# Patient Record
Sex: FEMALE | Race: AMERICAN INDIAN OR ALASKA NATIVE
[De-identification: names, ages, dates, MRNs, and addresses within clinical notes are randomized per-mention and may not be internally consistent; named-entity substitution may affect disease eponyms.]

---

## 2019-04-13 ENCOUNTER — HOSPITAL ENCOUNTER (EMERGENCY)
Dept: HOSPITAL 5 - ED | Age: 24
LOS: 1 days | Discharge: HOME | End: 2019-04-14
Payer: MEDICAID

## 2019-04-13 VITALS — DIASTOLIC BLOOD PRESSURE: 70 MMHG | SYSTOLIC BLOOD PRESSURE: 117 MMHG

## 2019-04-13 DIAGNOSIS — F17.200: ICD-10-CM

## 2019-04-13 DIAGNOSIS — K52.9: Primary | ICD-10-CM

## 2019-04-13 DIAGNOSIS — F12.10: ICD-10-CM

## 2019-04-13 LAB
ALBUMIN SERPL-MCNC: 3.5 G/DL (ref 3.9–5)
ALT SERPL-CCNC: 10 UNITS/L (ref 7–56)
BASOPHILS # (AUTO): 0 K/MM3 (ref 0–0.1)
BASOPHILS NFR BLD AUTO: 0.4 % (ref 0–1.8)
BILIRUB UR QL STRIP: (no result)
BLOOD UR QL VISUAL: (no result)
BUN SERPL-MCNC: 7 MG/DL (ref 7–17)
BUN/CREAT SERPL: 12 %
CALCIUM SERPL-MCNC: 8.5 MG/DL (ref 8.4–10.2)
EOSINOPHIL # BLD AUTO: 0.1 K/MM3 (ref 0–0.4)
EOSINOPHIL NFR BLD AUTO: 1.1 % (ref 0–4.3)
HCT VFR BLD CALC: 35.7 % (ref 30.3–42.9)
HEMOLYSIS INDEX: 9
HGB BLD-MCNC: 11.2 GM/DL (ref 10.1–14.3)
LYMPHOCYTES # BLD AUTO: 1.9 K/MM3 (ref 1.2–5.4)
LYMPHOCYTES NFR BLD AUTO: 22.3 % (ref 13.4–35)
MCHC RBC AUTO-ENTMCNC: 32 % (ref 30–34)
MCV RBC AUTO: 78 FL (ref 79–97)
MONOCYTES # (AUTO): 0.4 K/MM3 (ref 0–0.8)
MONOCYTES % (AUTO): 4.7 % (ref 0–7.3)
MUCOUS THREADS #/AREA URNS HPF: (no result) /HPF
PH UR STRIP: 6 [PH] (ref 5–7)
PLATELET # BLD: 259 K/MM3 (ref 140–440)
PROT UR STRIP-MCNC: (no result) MG/DL
RBC # BLD AUTO: 4.56 M/MM3 (ref 3.65–5.03)
RBC #/AREA URNS HPF: 1 /HPF (ref 0–6)
UROBILINOGEN UR-MCNC: 2 MG/DL (ref ?–2)
WBC #/AREA URNS HPF: 3 /HPF (ref 0–6)

## 2019-04-13 PROCEDURE — 81001 URINALYSIS AUTO W/SCOPE: CPT

## 2019-04-13 PROCEDURE — 85025 COMPLETE CBC W/AUTO DIFF WBC: CPT

## 2019-04-13 PROCEDURE — 81025 URINE PREGNANCY TEST: CPT

## 2019-04-13 PROCEDURE — 80053 COMPREHEN METABOLIC PANEL: CPT

## 2019-04-13 PROCEDURE — 83690 ASSAY OF LIPASE: CPT

## 2019-04-13 PROCEDURE — 74176 CT ABD & PELVIS W/O CONTRAST: CPT

## 2019-04-13 PROCEDURE — 36415 COLL VENOUS BLD VENIPUNCTURE: CPT

## 2019-04-13 NOTE — EMERGENCY DEPARTMENT REPORT
Vomiting/Diarrhea





- HPI


Chief Complaint: Abdominal Pain


Stated Complaint: ABD PAIN VOMITTING


Time Seen by Provider: 19 22:09


Duration: 3 Days


Severity: moderate


Nausea/Vomiting Severity: Moderate


Diarrhea Severity: None


Pain Location: Generalized


Pain Severity: Moderate


Symptoms: Yes Able to Tolerate Fluids, No Watery Diarrhea, No Bloody diarrhea, 

No Fever, No Recent Unusual Foods, No Recent Untreated Water, No Recent use of 

Antibiotics, No Family w/ Similar Symptoms, No Contacts w/ Similar Symptoms, No 

Rash, No Hematuria, No Recent URI Symptoms


Other History: This is a 23-year-old -American female who presents with 

nausea and vomiting, abdominal pain for 3 days.  Patient's pain initially 

started after her last cycle on 2019.  States symptoms for about 3-4 days 

but returned 3 days ago.  Patient is  A0.  States she does not believe she 

is pregnant but also concerned.  She denies diarrhea, fever, dizziness, urinary 

frequency, urgency, dysuria, vaginal bleeding or discharge.





ED Review of Systems


ROS: 


Stated complaint: ABD PAIN VOMITTING


Other details as noted in HPI





Constitutional: denies: chills, fever


Respiratory: denies: cough, shortness of breath, wheezing


Cardiovascular: denies: chest pain, palpitations


Gastrointestinal: abdominal pain, nausea, vomiting.  denies: diarrhea


Genitourinary: denies: urgency, dysuria, discharge


Musculoskeletal: denies: back pain


Skin: denies: rash, lesions


Neurological: denies: headache, weakness, paresthesias


Psychiatric: denies: anxiety, depression





ED Past Medical Hx





- Past Medical History


Previous Medical History?: No





- Surgical History


Past Surgical History?: Yes


Additional Surgical History:  x1





- Social History


Smoking Status: Current Every Day Smoker


Substance Use Type: Marijuana





- Medications


Home Medications: 


                                Home Medications











 Medication  Instructions  Recorded  Confirmed  Last Taken  Type


 


Naproxen [Naprosyn] 500 mg PO BID #14 tablet 18  Unknown Rx


 


Ibuprofen [Motrin 800 MG tab] 800 mg PO Q8HR PRN #12 tablet 19  Unknown Rx


 


Ondansetron [Zofran Odt] 4 mg PO Q8HR PRN #12 tab.rapdis 19  Unknown Rx














Vomiting Diarrhea Exam





- Exam


General: 


Vital signs noted. No distress. Alert and acting appropriately.





HEENT: Yes Moist Mucous Membranes, No Pharyngeal Erythema, No Pharyngeal 

Exudates, No Rhinorrhea, No Conjuctival Injection, No Frontal Tenderness, No 

Maxillary Tenderness


Neck: No Adenopathy, No Rigidity


Lungs: Yes Clear Lung Sounds, Yes Good Air Exchange, No Wheezes, No Stridor, No 

Cough, No Nasal Flaring, No Retractions, No Use of Accessory Muscles


Heart exam: Regular: Yes, Murmur: No, Tachycardia: No


Abdomen: Tenderness: Yes (left lower quadrant or right lower quadrant), 

Peritoneal Signs: No, Distention: No, Hyperactive Bowel sounds: No


Skin exam: Rash: No, Edema: No, Normal turgor: Yes


Neurologic: 


Alert and oriented, no deficits.








Musculoskeletal: 


Unremarkable.











ED Course


                                   Vital Signs











  19





  20:16


 


Temperature 98.8 F


 


Pulse Rate 101 H


 


Respiratory 16





Rate 


 


Blood Pressure 117/70














ED Medical Decision Making





- Lab Data


Result diagrams: 


                                 19 22:49





                                 19 22:49





                                   Lab Results











  19 Range/Units





  20:32 22:49 22:49 


 


WBC   8.5   (4.5-11.0)  K/mm3


 


RBC   4.56   (3.65-5.03)  M/mm3


 


Hgb   11.2   (10.1-14.3)  gm/dl


 


Hct   35.7   (30.3-42.9)  %


 


MCV   78 L   (79-97)  fl


 


MCH   25 L   (28-32)  pg


 


MCHC   32   (30-34)  %


 


RDW   16.5 H   (13.2-15.2)  %


 


Plt Count   259   (140-440)  K/mm3


 


Lymph % (Auto)   22.3   (13.4-35.0)  %


 


Mono % (Auto)   4.7   (0.0-7.3)  %


 


Eos % (Auto)   1.1   (0.0-4.3)  %


 


Baso % (Auto)   0.4   (0.0-1.8)  %


 


Lymph #   1.9   (1.2-5.4)  K/mm3


 


Mono #   0.4   (0.0-0.8)  K/mm3


 


Eos #   0.1   (0.0-0.4)  K/mm3


 


Baso #   0.0   (0.0-0.1)  K/mm3


 


Seg Neutrophils %   71.5 H   (40.0-70.0)  %


 


Seg Neutrophils #   6.1   (1.8-7.7)  K/mm3


 


Sodium    138  (137-145)  mmol/L


 


Potassium    3.8  (3.6-5.0)  mmol/L


 


Chloride    107.0  ()  mmol/L


 


Carbon Dioxide    21 L  (22-30)  mmol/L


 


Anion Gap    14  mmol/L


 


BUN    7  (7-17)  mg/dL


 


Creatinine    0.6 L  (0.7-1.2)  mg/dL


 


Estimated GFR    > 60  ml/min


 


BUN/Creatinine Ratio    12  %


 


Glucose    94  ()  mg/dL


 


Calcium    8.5  (8.4-10.2)  mg/dL


 


Total Bilirubin    0.20  (0.1-1.2)  mg/dL


 


AST    13  (5-40)  units/L


 


ALT    10  (7-56)  units/L


 


Alkaline Phosphatase    60  ()  units/L


 


Total Protein    5.8 L  (6.3-8.2)  g/dL


 


Albumin    3.5 L  (3.9-5)  g/dL


 


Albumin/Globulin Ratio    1.5  %


 


Lipase     (13-60)  units/L


 


Urine Color  Yellow    (Yellow)  


 


Urine Turbidity  Slightly-cloudy    (Clear)  


 


Urine pH  6.0    (5.0-7.0)  


 


Ur Specific Gravity  1.034 H    (1.003-1.030)  


 


Urine Protein  <15 mg/dl    (Negative)  mg/dL


 


Urine Glucose (UA)  Neg    (Negative)  mg/dL


 


Urine Ketones  Tr    (Negative)  mg/dL


 


Urine Blood  Neg    (Negative)  


 


Urine Nitrite  Neg    (Negative)  


 


Urine Bilirubin  Neg    (Negative)  


 


Urine Urobilinogen  2.0    (<2.0)  mg/dL


 


Ur Leukocyte Esterase  Tr    (Negative)  


 


Urine WBC (Auto)  3.0    (0.0-6.0)  /HPF


 


Urine RBC (Auto)  1.0    (0.0-6.0)  /HPF


 


U Epithel Cells (Auto)  16.0 H    (0-13.0)  /HPF


 


Urine Mucus  3+    /HPF


 


Urine HCG, Qual  Negative    (Negative)  














  19 Range/Units





  22:49 


 


WBC   (4.5-11.0)  K/mm3


 


RBC   (3.65-5.03)  M/mm3


 


Hgb   (10.1-14.3)  gm/dl


 


Hct   (30.3-42.9)  %


 


MCV   (79-97)  fl


 


MCH   (28-32)  pg


 


MCHC   (30-34)  %


 


RDW   (13.2-15.2)  %


 


Plt Count   (140-440)  K/mm3


 


Lymph % (Auto)   (13.4-35.0)  %


 


Mono % (Auto)   (0.0-7.3)  %


 


Eos % (Auto)   (0.0-4.3)  %


 


Baso % (Auto)   (0.0-1.8)  %


 


Lymph #   (1.2-5.4)  K/mm3


 


Mono #   (0.0-0.8)  K/mm3


 


Eos #   (0.0-0.4)  K/mm3


 


Baso #   (0.0-0.1)  K/mm3


 


Seg Neutrophils %   (40.0-70.0)  %


 


Seg Neutrophils #   (1.8-7.7)  K/mm3


 


Sodium   (137-145)  mmol/L


 


Potassium   (3.6-5.0)  mmol/L


 


Chloride   ()  mmol/L


 


Carbon Dioxide   (22-30)  mmol/L


 


Anion Gap   mmol/L


 


BUN   (7-17)  mg/dL


 


Creatinine   (0.7-1.2)  mg/dL


 


Estimated GFR   ml/min


 


BUN/Creatinine Ratio   %


 


Glucose   ()  mg/dL


 


Calcium   (8.4-10.2)  mg/dL


 


Total Bilirubin   (0.1-1.2)  mg/dL


 


AST   (5-40)  units/L


 


ALT   (7-56)  units/L


 


Alkaline Phosphatase   ()  units/L


 


Total Protein   (6.3-8.2)  g/dL


 


Albumin   (3.9-5)  g/dL


 


Albumin/Globulin Ratio   %


 


Lipase  32  (13-60)  units/L


 


Urine Color   (Yellow)  


 


Urine Turbidity   (Clear)  


 


Urine pH   (5.0-7.0)  


 


Ur Specific Gravity   (1.003-1.030)  


 


Urine Protein   (Negative)  mg/dL


 


Urine Glucose (UA)   (Negative)  mg/dL


 


Urine Ketones   (Negative)  mg/dL


 


Urine Blood   (Negative)  


 


Urine Nitrite   (Negative)  


 


Urine Bilirubin   (Negative)  


 


Urine Urobilinogen   (<2.0)  mg/dL


 


Ur Leukocyte Esterase   (Negative)  


 


Urine WBC (Auto)   (0.0-6.0)  /HPF


 


Urine RBC (Auto)   (0.0-6.0)  /HPF


 


U Epithel Cells (Auto)   (0-13.0)  /HPF


 


Urine Mucus   /HPF


 


Urine HCG, Qual   (Negative)  














- Radiology Data


Radiology results: report reviewed





PROCEDURE: CT ABDOMEN PELVIS WO CON 





TECHNIQUE: Computerized axial tomography of the abdomen and pelvis was performed

without 


intravenous contrast. This study is performed without intravascular contrast 

material and its 


sensitivity for abdominal and pelvic pathology, including neoplasms, 

inflammation, abscess, free 


fluid, thrombosis, arterial dissection and infarction, is reduced compared with 

a contrast enhanced 


study. 





CT DOSE LENGTH PRODUCT: 1160.5 mGycm 





HISTORY: LLQ RLQ tenderness 





COMPARISONS: None . 





FINDINGS: 





Visualized lower thorax: No significant abnormality. 


Liver: Normal size and attenuation. 


Spleen: Normal size and attenuation. 


Gallbladder and biliary system: Normal. 


Pancreas: Normal. 


Adrenals: Normal. 


Kidneys: Normal. 


GI tract: The stomach is normal. The small bowel has a normal caliber. No 

obstruction is seen. The


cecum is normal. The appendix is normal. The colon has a normal appearance. . 


Lymph nodes and mesentery: Normal. 


Vasculature: Normal.. 


Bladder: Normal. 


Reproductive organs: No pelvic masses. 


Peritoneum: No free fluid. 


Musculoskeletal structures: No significant abnormality. 


Other: None. 





IMPRESSION: 





There is no evidence of intestinal or urinary tract obstruction. No ileus or 

enteritis. The 


appendix is normal. . 











- Medical Decision Making





This is a 23 y.o. female that presents with nausea, vomiting, abdominal pain for

1 month intermittently.  Patient is stable and was examined by me. Vitals 

stable.  Given tramadol one linear.  Obtained CMP, CBC, UA, & CT. All labs 

unremarkable.  CT dictated by radiologist and reported the.  There is no 

evidence of intestinal or urinary tract obstruction.  No ileus or enteritis. The

appendix is normal.  Start ibuprofen and zofran for gastritis. Discussed plan 

with patient and agreed to plan. No further questions noted by the patient. 

Discharged home in stable condition. Follow up with PCP in 2-3 days.


Critical care attestation.: 


If time is entered above; I have spent that time in minutes in the direct care 

of this critically ill patient, excluding procedure time.








ED Disposition


Clinical Impression: 


 Nausea and vomiting in adult, Gastroenteritis





Abdominal pain


Qualifiers:


 Abdominal location: generalized Qualified Code(s): R10.84 - Generalized 

abdominal pain





Disposition: - TO HOME OR SELFCARE


Is pt being admited?: No


Does the pt Need Aspirin: No


Condition: Stable


Instructions:  Gastroenteritis (ED), Acute Nausea and Vomiting (ED), Abdominal 

Pain (ED)


Additional Instructions: 


Frequent hand washing is important to reduce spread.





Prompt disinfection of contaminated surfaces with household chlorine bleach-

based  and washing of soiled clothing and bedding should be advised.





If food or water is thought to be contaminated, it should be avoided.





Increase fluid intake.





Drinks high in sugars such as carbonated soft drinks, fruit juice, and highly 

sugared liquids should be avoided.


Prescriptions: 


Ibuprofen [Motrin 800 MG tab] 800 mg PO Q8HR PRN #12 tablet


 PRN Reason: Pain , Severe (7-10)


Ondansetron [Zofran Odt] 4 mg PO Q8HR PRN #12 tab.rapdis


 PRN Reason: Nausea And Vomiting


Referrals: 


[facility] [place] [Primary Care Provider] - 3-5 Days


[facility] [place] [Outside] - 3-5 Days


[facility] [Outside] - 3-5 Days


Time of Disposition: 01:32

## 2019-04-14 NOTE — CAT SCAN REPORT
PROCEDURE: CT ABDOMEN PELVIS WO CON 

 

TECHNIQUE:  Computerized axial tomography of the abdomen and pelvis was performed without intravenous
 contrast. This study is performed without intravascular contrast material and its sensitivity for ab
dominal and pelvic pathology, including neoplasms, inflammation, abscess, free fluid, thrombosis, art
erial dissection and infarction, is reduced compared with a contrast enhanced study.  

 

CT DOSE LENGTH PRODUCT:  1160.5  mGycm 

  

HISTORY:  LLQ   RLQ tenderness  

 

COMPARISONS:  None . 

 

FINDINGS: 

 

Visualized lower thorax: No significant abnormality. 

Liver: Normal size and attenuation. 

Spleen: Normal size and attenuation. 

Gallbladder and biliary system: Normal. 

Pancreas:  Normal. 

Adrenals: Normal. 

Kidneys: Normal. 

GI tract:  The stomach is normal. The small bowel has a normal caliber. No obstruction is seen. The c
ecum is normal. The appendix is normal. The colon has a normal appearance. . 

Lymph nodes and mesentery: Normal. 

Vasculature: Normal.. 

Bladder: Normal. 

Reproductive organs: No pelvic masses. 

Peritoneum: No free fluid. 

Musculoskeletal structures: No significant abnormality. 

Other:  None. 

 

IMPRESSION:   

 

There is no evidence of intestinal or urinary tract obstruction. No ileus or enteritis. The appendix 
is normal. . 

 

 

This document is electronically signed by Laney Herrera DO., April 14 2019 01:22:49 AM ET